# Patient Record
Sex: FEMALE | Race: BLACK OR AFRICAN AMERICAN | NOT HISPANIC OR LATINO | URBAN - METROPOLITAN AREA
[De-identification: names, ages, dates, MRNs, and addresses within clinical notes are randomized per-mention and may not be internally consistent; named-entity substitution may affect disease eponyms.]

---

## 2021-03-30 ENCOUNTER — OFFICE VISIT (OUTPATIENT)
Dept: OBSTETRICS AND GYNECOLOGY | Facility: CLINIC | Age: 56
End: 2021-03-30
Payer: COMMERCIAL

## 2021-03-30 VITALS
BODY MASS INDEX: 25.83 KG/M2 | DIASTOLIC BLOOD PRESSURE: 88 MMHG | HEIGHT: 65 IN | SYSTOLIC BLOOD PRESSURE: 131 MMHG | WEIGHT: 155 LBS | HEART RATE: 84 BPM

## 2021-03-30 DIAGNOSIS — Z01.419 WELL WOMAN EXAM WITH ROUTINE GYNECOLOGICAL EXAM: Primary | ICD-10-CM

## 2021-03-30 PROCEDURE — 99396 PR PREVENTIVE VISIT,EST,40-64: ICD-10-PCS | Mod: S$GLB,,, | Performed by: OBSTETRICS & GYNECOLOGY

## 2021-03-30 PROCEDURE — 99396 PREV VISIT EST AGE 40-64: CPT | Mod: S$GLB,,, | Performed by: OBSTETRICS & GYNECOLOGY

## 2021-03-30 PROCEDURE — 3008F PR BODY MASS INDEX (BMI) DOCUMENTED: ICD-10-PCS | Mod: CPTII,S$GLB,, | Performed by: OBSTETRICS & GYNECOLOGY

## 2021-03-30 PROCEDURE — 3008F BODY MASS INDEX DOCD: CPT | Mod: CPTII,S$GLB,, | Performed by: OBSTETRICS & GYNECOLOGY

## 2023-02-07 ENCOUNTER — TELEPHONE (OUTPATIENT)
Dept: OBSTETRICS AND GYNECOLOGY | Facility: CLINIC | Age: 58
End: 2023-02-07
Payer: COMMERCIAL

## 2023-02-07 ENCOUNTER — OFFICE VISIT (OUTPATIENT)
Dept: OBSTETRICS AND GYNECOLOGY | Facility: CLINIC | Age: 58
End: 2023-02-07
Payer: COMMERCIAL

## 2023-02-07 VITALS
SYSTOLIC BLOOD PRESSURE: 119 MMHG | HEART RATE: 93 BPM | WEIGHT: 157 LBS | BODY MASS INDEX: 26.13 KG/M2 | DIASTOLIC BLOOD PRESSURE: 70 MMHG

## 2023-02-07 DIAGNOSIS — N63.11 MASS OF UPPER OUTER QUADRANT OF RIGHT BREAST: Primary | ICD-10-CM

## 2023-02-07 PROCEDURE — 1159F PR MEDICATION LIST DOCUMENTED IN MEDICAL RECORD: ICD-10-PCS | Mod: CPTII,S$GLB,, | Performed by: OBSTETRICS & GYNECOLOGY

## 2023-02-07 PROCEDURE — 99213 OFFICE O/P EST LOW 20 MIN: CPT | Mod: S$GLB,,, | Performed by: OBSTETRICS & GYNECOLOGY

## 2023-02-07 PROCEDURE — 3074F PR MOST RECENT SYSTOLIC BLOOD PRESSURE < 130 MM HG: ICD-10-PCS | Mod: CPTII,S$GLB,, | Performed by: OBSTETRICS & GYNECOLOGY

## 2023-02-07 PROCEDURE — 3008F PR BODY MASS INDEX (BMI) DOCUMENTED: ICD-10-PCS | Mod: CPTII,S$GLB,, | Performed by: OBSTETRICS & GYNECOLOGY

## 2023-02-07 PROCEDURE — 3078F DIAST BP <80 MM HG: CPT | Mod: CPTII,S$GLB,, | Performed by: OBSTETRICS & GYNECOLOGY

## 2023-02-07 PROCEDURE — 99213 PR OFFICE/OUTPT VISIT, EST, LEVL III, 20-29 MIN: ICD-10-PCS | Mod: S$GLB,,, | Performed by: OBSTETRICS & GYNECOLOGY

## 2023-02-07 PROCEDURE — 3008F BODY MASS INDEX DOCD: CPT | Mod: CPTII,S$GLB,, | Performed by: OBSTETRICS & GYNECOLOGY

## 2023-02-07 PROCEDURE — 3078F PR MOST RECENT DIASTOLIC BLOOD PRESSURE < 80 MM HG: ICD-10-PCS | Mod: CPTII,S$GLB,, | Performed by: OBSTETRICS & GYNECOLOGY

## 2023-02-07 PROCEDURE — 3074F SYST BP LT 130 MM HG: CPT | Mod: CPTII,S$GLB,, | Performed by: OBSTETRICS & GYNECOLOGY

## 2023-02-07 PROCEDURE — 1159F MED LIST DOCD IN RCRD: CPT | Mod: CPTII,S$GLB,, | Performed by: OBSTETRICS & GYNECOLOGY

## 2023-02-07 RX ORDER — MELOXICAM 15 MG/1
15 TABLET ORAL
COMMUNITY
Start: 2023-02-03

## 2023-02-07 NOTE — TELEPHONE ENCOUNTER
Phone call returned to patient.  She reports finding a knot in her right breast. Her last mammogram was over three years ago.

## 2023-02-07 NOTE — TELEPHONE ENCOUNTER
----- Message from Pinky Brenner sent at 2/7/2023  8:36 AM CST -----  Contact: Debora Cazares needs a call back at [phone], Regards to a personal matter that she didn't wish to say.    Thanks  td

## 2023-02-07 NOTE — PROGRESS NOTES
Subjective:       Patient ID: Debora Pierson is a 57 y.o. female.    Chief Complaint:  Breast Pain (PATIENT C/O A LUMP ON RIGHT BREAST THAT SHE NOTICED . )      History of Present Illness  She is doing well.  No new health issues,  No abnormal bleeding, No GI or Gu concerns,  No dyspareunia.   She has been having breast  right sided  after a run      HPI      GYN & OB History  No LMP recorded. Patient is postmenopausal.     Date of Last Pap: No result found    OB History    Para Term  AB Living   3 2     1     SAB IAB Ectopic Multiple Live Births                  # Outcome Date GA Lbr Tadeo/2nd Weight Sex Delivery Anes PTL Lv   3 AB            2 Para            1 Para                Review of Systems  Review of Systems          Objective:    Physical Exam    she has a mobile mass 2cm 11:00  above the breast tissue in there upper right chest.  She ran a marathon and feels the water [place] might have caused.   Bilateral her breast are normal and no skin changes   No masses  no nipple D/C  no axillary lymph nodes or mass      Assessment:        1. Mass of upper outer quadrant of right breast               Plan:             Mammogram/U/S ...  Vit E 400 IU per day  Consider Oil of Primrose  Follow up in 6 weeks or sooner PRN  Pt is aware we call all results. If she does not hear from our office regarding her result within a week of having a study or procedure performed she is to call the office so that we can research the result for her as I do not know when patients are having procedures done.  She is aware that this is a conformation that the result does come to me and is reviewed.  Chaperone was present

## 2023-03-13 DIAGNOSIS — R63.5 WEIGHT GAIN: Primary | ICD-10-CM

## 2023-03-13 NOTE — TELEPHONE ENCOUNTER
----- Message from Justina Mccollum sent at 3/13/2023  3:08 PM CDT -----  Regarding: New Prescription  Contact: patient  Per phone call with patient, she is needing a new prescription for CONTRAVE .  Please return call at [phone].      Thanks,  SJ

## 2023-04-19 ENCOUNTER — TELEPHONE (OUTPATIENT)
Dept: OBSTETRICS AND GYNECOLOGY | Facility: CLINIC | Age: 58
End: 2023-04-19
Payer: COMMERCIAL

## 2023-04-19 NOTE — TELEPHONE ENCOUNTER
----- Message from Thania Schofield sent at 4/19/2023  1:10 PM CDT -----  Contact: self  The hospital is saying they do not have orders for the imaging Dr Forman wanted to have done before the patient did a mammogram. Please call back at [phone]  - pt is not currently at the [facility]

## 2023-04-19 NOTE — TELEPHONE ENCOUNTER
Spoke to patient, she states she lost her mammogram and breast ultrasound paperwork. Advised that I will fax to the facility that she has her imaging done and they will contact her to schedule her appointment. Advised she does not need the paperwork. Patient verbalized understanding. Breast studies faxed to [facility].

## 2023-05-16 ENCOUNTER — TELEPHONE (OUTPATIENT)
Dept: OBSTETRICS AND GYNECOLOGY | Facility: CLINIC | Age: 58
End: 2023-05-16
Payer: COMMERCIAL

## 2023-05-16 DIAGNOSIS — R92.8 ABNORMAL MAMMOGRAM: ICD-10-CM

## 2023-05-16 DIAGNOSIS — N63.11 MASS OF UPPER OUTER QUADRANT OF RIGHT BREAST: Primary | ICD-10-CM

## 2023-05-16 NOTE — TELEPHONE ENCOUNTER
Spoke to patient. Faxed stat order for breast biopsy. Will check with patient to make sure they contacted her to schedule.

## 2023-05-16 NOTE — TELEPHONE ENCOUNTER
----- Message from Iram Wanda sent at 5/16/2023  8:18 AM CDT -----  Type:  Needs Medical Advice    Who Called: Debora Pierson  Symptoms (please be specific): -   How long has patient had these symptoms:  -  Pharmacy name and phone #:  -  Would the patient rather a call back or a response via [facility]?    Best Call Back Number: [phone]    Additional Information:  pt needs to speak w/ the nurse Yuliana to scheduled ( Dr Forman called her on yesterday and told her the radiologist found a spot ) please call

## 2023-05-22 NOTE — TELEPHONE ENCOUNTER
Spoke to patient. Gave reassurance that the biopsy will give us the answers she is looking for. She is interested in seeing Dr. Kathy Nash if the area needs to be addressed further.

## 2023-05-22 NOTE — TELEPHONE ENCOUNTER
Spoke to patient. She is scheduled for her biopsy this Wednesday. She is wanting to know how the study is graded and what it means. Hers is bi-rads 4 suspicious abnormality.     She also was curious about the actual imaging. She states that she was watching the screen during the study and was confused on the areas they were looking at because it was black in color on the screen. She thought black meant that it was fluid.     Dr. Chaudhary needs copy of report sent to her. Will fax.

## 2023-05-25 LAB — SPECIMEN TO PATHOLOGY: NORMAL

## 2023-05-29 ENCOUNTER — TELEPHONE (OUTPATIENT)
Dept: OBSTETRICS AND GYNECOLOGY | Facility: CLINIC | Age: 58
End: 2023-05-29
Payer: COMMERCIAL

## 2023-05-29 NOTE — TELEPHONE ENCOUNTER
Called and spoke to patient. [facility] has reached out to her but she is not scheduled yet. Advised patient to contact me the middle of this week if she does not hear back from them.     She also request that we fax her breast studies and pathology to Dr. Chaudhary. I attempted to call for fax number but their office is closed. Will try again tomorrow. Phone: [phone]

## 2023-05-31 ENCOUNTER — TELEPHONE (OUTPATIENT)
Dept: OBSTETRICS AND GYNECOLOGY | Facility: CLINIC | Age: 58
End: 2023-05-31
Payer: COMMERCIAL

## 2023-05-31 ENCOUNTER — PATIENT MESSAGE (OUTPATIENT)
Dept: OBSTETRICS AND GYNECOLOGY | Facility: CLINIC | Age: 58
End: 2023-05-31
Payer: COMMERCIAL

## 2023-05-31 NOTE — TELEPHONE ENCOUNTER
----- Message from Irammarci Muñoz sent at 5/31/2023  1:06 PM CDT -----  Type:  Needs Medical Advice    Who Called: Debora Pierson,    Symptoms (please be specific): -   How long has patient had these symptoms:  -  Pharmacy name and phone #:  -  Would the patient rather a call back or a response via [facility]?    Best Call Back Number: [phone]    Additional Information:  needs to speak w/ you about a referral please call

## 2023-06-02 ENCOUNTER — PATIENT MESSAGE (OUTPATIENT)
Dept: OBSTETRICS AND GYNECOLOGY | Facility: CLINIC | Age: 58
End: 2023-06-02
Payer: COMMERCIAL

## 2023-06-02 LAB — SPECIMEN TO PATHOLOGY: NORMAL

## 2023-06-08 ENCOUNTER — PATIENT MESSAGE (OUTPATIENT)
Dept: OBSTETRICS AND GYNECOLOGY | Facility: CLINIC | Age: 58
End: 2023-06-08
Payer: COMMERCIAL

## 2023-06-13 ENCOUNTER — PATIENT MESSAGE (OUTPATIENT)
Dept: OBSTETRICS AND GYNECOLOGY | Facility: CLINIC | Age: 58
End: 2023-06-13
Payer: COMMERCIAL

## 2023-06-27 ENCOUNTER — PATIENT MESSAGE (OUTPATIENT)
Dept: OBSTETRICS AND GYNECOLOGY | Facility: CLINIC | Age: 58
End: 2023-06-27
Payer: COMMERCIAL

## 2023-08-03 DIAGNOSIS — R63.5 WEIGHT GAIN: ICD-10-CM

## 2023-08-08 RX ORDER — NALTREXONE HYDROCHLORIDE AND BUPROPION HYDROCHLORIDE 8; 90 MG/1; MG/1
2 TABLET, EXTENDED RELEASE ORAL 2 TIMES DAILY
Qty: 120 TABLET | Refills: 3 | Status: SHIPPED | OUTPATIENT
Start: 2023-08-08

## 2024-01-12 ENCOUNTER — TELEPHONE (OUTPATIENT)
Dept: OBSTETRICS AND GYNECOLOGY | Facility: CLINIC | Age: 59
End: 2024-01-12
Payer: COMMERCIAL

## 2024-01-12 NOTE — TELEPHONE ENCOUNTER
Returned patient's call. She had her mase[facility] done today [facility]. She is having some pain but is managing with pain medications.

## 2024-01-12 NOTE — TELEPHONE ENCOUNTER
----- Message from Corrine Bird sent at 1/12/2024  1:42 PM CST -----  Contact: debora  .Type:  Patient Returning Call    Who Called:Debora   Who Left Message for Patient:nurse   Does the patient know what this is regarding?: Unknown   Would the patient rather a call back or a response via [facility]? Call   Best Call Back Number:.[phone]   Additional Information: pt returning a call

## 2024-04-05 ENCOUNTER — TELEPHONE (OUTPATIENT)
Dept: OBSTETRICS AND GYNECOLOGY | Facility: CLINIC | Age: 59
End: 2024-04-05
Payer: COMMERCIAL

## 2024-04-05 NOTE — TELEPHONE ENCOUNTER
Pt was returning Dr. Forman's phone call. He was just calling to check on her. She said to tell him that she is doing fine.

## 2024-04-05 NOTE — TELEPHONE ENCOUNTER
----- Message from Jeimy Kwon sent at 4/5/2024  9:24 AM CDT -----  Contact: Patient  Patient called to consult with nurse or staff regarding a missed call. She would like a call back and can be reached at [phone]. Thanks/